# Patient Record
Sex: FEMALE | Race: WHITE
[De-identification: names, ages, dates, MRNs, and addresses within clinical notes are randomized per-mention and may not be internally consistent; named-entity substitution may affect disease eponyms.]

---

## 2020-11-15 ENCOUNTER — HOSPITAL ENCOUNTER (EMERGENCY)
Dept: HOSPITAL 33 - ED | Age: 45
Discharge: HOME | End: 2020-11-15
Payer: COMMERCIAL

## 2020-11-15 VITALS — DIASTOLIC BLOOD PRESSURE: 76 MMHG | HEART RATE: 59 BPM | OXYGEN SATURATION: 99 % | SYSTOLIC BLOOD PRESSURE: 144 MMHG

## 2020-11-15 DIAGNOSIS — K29.00: Primary | ICD-10-CM

## 2020-11-15 LAB
ALBUMIN SERPL-MCNC: 4.8 G/DL (ref 3.5–5)
ALP SERPL-CCNC: 55 U/L (ref 38–126)
ALT SERPL-CCNC: 18 U/L (ref 0–35)
AMYLASE SERPL-CCNC: 143 U/L (ref 30–110)
ANION GAP SERPL CALC-SCNC: 10.9 MEQ/L (ref 5–15)
AST SERPL QL: 27 U/L (ref 14–36)
BASOPHILS # BLD AUTO: 0.01 10*3/UL (ref 0–0.4)
BASOPHILS NFR BLD AUTO: 0.2 % (ref 0–0.4)
BILIRUB BLD-MCNC: 0.7 MG/DL (ref 0.2–1.3)
BUN SERPL-MCNC: 18 MG/DL (ref 7–17)
CALCIUM SPEC-MCNC: 9.4 MG/DL (ref 8.4–10.2)
CHLORIDE SERPL-SCNC: 102 MMOL/L (ref 98–107)
CO2 SERPL-SCNC: 30 MMOL/L (ref 22–30)
CREAT SERPL-MCNC: 0.8 MG/DL (ref 0.52–1.04)
EOSINOPHIL # BLD AUTO: 0.06 10*3/UL (ref 0–0.5)
GFR SERPLBLD BASED ON 1.73 SQ M-ARVRAT: > 60 ML/MIN
GLUCOSE SERPL-MCNC: 94 MG/DL (ref 74–106)
GLUCOSE UR-MCNC: NEGATIVE MG/DL
HCT VFR BLD AUTO: 40.1 % (ref 35–47)
HGB BLD-MCNC: 13.3 GM/DL (ref 12–16)
LIPASE SERPL-CCNC: 200 U/L (ref 23–300)
LYMPHOCYTES # SPEC AUTO: 2.12 10*3/UL (ref 1–4.6)
MCH RBC QN AUTO: 28.4 PG (ref 26–32)
MCHC RBC AUTO-ENTMCNC: 33.2 G/DL (ref 32–36)
MONOCYTES # BLD AUTO: 0.38 10*3/UL (ref 0–1.3)
PLATELET # BLD AUTO: 266 K/MM3 (ref 150–450)
POTASSIUM SERPLBLD-SCNC: 4.3 MMOL/L (ref 3.5–5.1)
PROT SERPL-MCNC: 8.8 G/DL (ref 6.3–8.2)
PROT UR STRIP-MCNC: NEGATIVE MG/DL
RBC # BLD AUTO: 4.69 M/MM3 (ref 4.1–5.4)
RBC #/AREA URNS HPF: (no result) /HPF (ref 0–2)
SODIUM SERPL-SCNC: 138 MMOL/L (ref 137–145)
WBC # BLD AUTO: 4.9 K/MM3 (ref 4–10.5)
WBC #/AREA URNS HPF: (no result) /HPF (ref 0–5)

## 2020-11-15 PROCEDURE — 76705 ECHO EXAM OF ABDOMEN: CPT

## 2020-11-15 PROCEDURE — 96375 TX/PRO/DX INJ NEW DRUG ADDON: CPT

## 2020-11-15 PROCEDURE — 74177 CT ABD & PELVIS W/CONTRAST: CPT

## 2020-11-15 PROCEDURE — 96374 THER/PROPH/DIAG INJ IV PUSH: CPT

## 2020-11-15 PROCEDURE — 80053 COMPREHEN METABOLIC PANEL: CPT

## 2020-11-15 PROCEDURE — 36415 COLL VENOUS BLD VENIPUNCTURE: CPT

## 2020-11-15 PROCEDURE — 83690 ASSAY OF LIPASE: CPT

## 2020-11-15 PROCEDURE — 36000 PLACE NEEDLE IN VEIN: CPT

## 2020-11-15 PROCEDURE — 96360 HYDRATION IV INFUSION INIT: CPT

## 2020-11-15 PROCEDURE — 81001 URINALYSIS AUTO W/SCOPE: CPT

## 2020-11-15 PROCEDURE — 99284 EMERGENCY DEPT VISIT MOD MDM: CPT

## 2020-11-15 PROCEDURE — 96376 TX/PRO/DX INJ SAME DRUG ADON: CPT

## 2020-11-15 PROCEDURE — 82150 ASSAY OF AMYLASE: CPT

## 2020-11-15 PROCEDURE — 85025 COMPLETE CBC W/AUTO DIFF WBC: CPT

## 2020-11-15 NOTE — XRAY
Indication: Right upper quadrant pain and nausea.



Multiple contiguous axial images obtained through the abdomen and pelvis using

80 cc Isovue 370 contrast only.



Comparison: None



Lung bases demonstrates mild dependent atelectasis.  No infiltrate or

effusion.  Heart is not enlarged.



Noncontrasted stomach and bowel loops appear nonobstructed.  Appendectomy and

hysterectomy reported.  There is mild diffuse fecal debris throughout.  No

free fluid/air.  Tiny calcified splenic granulomas.



The remaining liver, gallbladder, pancreas, spleen, adrenal glands, kidneys,

ureters, bladder, and aorta appear unremarkable.  No pathologic

retroperitoneal lymphadenopathy.



Osseous structures intact.  No ventral or inguinal hernias.



Impression:

1.  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

granulomatous disease.

2.  Remaining CT abdomen/pelvis with contrast exam is negative.



Comment: Preliminary interpretation was made by [facility].  No critical discrepancy.

## 2020-11-15 NOTE — ERPHSYRPT
- History of Present Illness


Time Seen by Provider: 11/15/20 12:36


Historian: patient


Exam Limitations: no limitations


Patient Subjective Stated Complaint: pt has right upper quad pain since last 

night getting worse with nausea,BM today that  was normal, no difficulty with 

voiding


Triage Nursing Assessment: pt alert, walked in with face mask in place,holding 

onto right side of abd , no edema, skin w/d/p


Physician History: 





45 years old female presented to the ER with chief complaint of right upper 

quadrant pain sudden onset last night which lasted for almost an hour and eased 

up on its own before she went to bed.  This morning she woke up with pain right 

upper quadrant again moderate to severe intensity, sharp shooting in nature, 

comes and goes, aggravated with palpation and no significant relieving factors, 

associated with nausea but no vomiting.  Denies fever chills or cough.  No sick 

contact.


Timing/Duration: yesterday, sudden, worse


Activities at Onset: rest


Quality: sharpness, stabbing


Abdominal Pain Onset Location: RUQ


Pain Radiation: no radiation


Severity of Pain-Max: severe


Severity of Pain-Current: moderate


Modifying Factors: Worsens With: palpation


Associated Symptoms: nausea, No chest pain, No vomiting


Previous symptoms: no prior history


Allergies/Adverse Reactions: 








No Known Drug Allergies Allergy (Verified 11/15/20 12:28)


   





Home Medications: 








Alprazolam 1 mg*** [Xanax 1 mg***] 1 mg PO HSPRN PRN 12/15/13 [History]


Estradiol 1 mg*** [Estrace 1 mg***] 1 mg PO DAILY 12/15/13 [History]


Levothyroxine Sodium 1 ea DAILY 11/15/20 [History]





Hx Tetanus, Diphtheria Vaccination/Date Given: Yes


Hx Influenza Vaccination/Date Given: No


Hx Pneumococcal Vaccination/Date Given: No


Immunizations Up to Date: Yes





Travel Risk





- International Travel


Have you traveled outside of the country in past 3 weeks: No





- Coronavirus Screening


Are you exhibiting any of the following symptoms?: No


Close contact with a COVID-19 positive Pt in past 14-21 Days: Yes





- Review of Systems


Constitutional: No Symptoms


Eyes: No Symptoms


Ears, Nose, & Throat: No Symptoms


Respiratory: No Symptoms


Cardiac: No Symptoms


Abdominal/Gastrointestinal: Abdominal Pain, Nausea


Genitourinary Symptoms: No Symptoms


Musculoskeletal: No Symptoms


Skin: No Symptoms


Neurological: No Symptoms


Psychological: No Symptoms


Endocrine: No Symptoms


Hematologic/Lymphatic: No Symptoms


Immunological/Allergic: No Symptoms





- Past Medical History


Pertinent Past Medical History: Yes


Psycho-Social History: Anxiety





- Past Surgical History


Past Surgical History: Yes (see history of present illness)


Neuro Surgical History: No Pertinent History


Cardiac: No Pertinent History


Respiratory: No Pertinent History


Gastrointestinal: No Pertinent History


Genitourinary: No Pertinent History


Musculoskeletal: Orthopedic Surgery


Female Surgical History: Hysterectomy


Other Surgical History: LEFT SHOULDER BONE SPURS SHAVED OFF, knee surg





- Social History


Smoking Status: Never smoker


Exposure to second hand smoke: No


Drug Use: none


Patient Lives Alone: No





- Female History


Hx Last Menstrual Period: hyester


Hx Pregnant Now: No





- Nursing Vital Signs


Nursing Vital Signs: 


                               Initial Vital Signs











Pulse Rate  96 H  11/15/20 14:51


 


Respiratory Rate  18   11/15/20 14:51


 


Blood Pressure  119/80   11/15/20 14:51


 


O2 Sat by Pulse Oximetry  97   11/15/20 14:51








                                   Pain Scale











Pain Intensity                 0

















- Physical Exam


General Appearance: no apparent distress, alert


Eye Exam: PERRL/EOMI


Ears, Nose, Throat Exam: normal ENT inspection, TMs normal, pharynx normal


Neck Exam: normal inspection, non-tender, supple, full range of motion


Respiratory Exam: normal breath sounds, lungs clear


Cardiovascular Exam: regular rate/rhythm, normal heart sounds


Gastrointestinal/Abdomen Exam: soft, normal bowel sounds, tenderness (Right 

upper quadrant.  Positive Espinoza sign.), guarding


Back Exam: normal inspection, normal range of motion


Extremity Exam: normal inspection, normal range of motion


Neurologic Exam: alert, oriented x 3, cooperative


Skin Exam: normal color


**SpO2 Interpretation**: normal


O2 Delivery: Room Air


Ordered Tests: 


                               Active Orders 24 hr











 Category Date Time Status


 


 IV Insertion STAT Care  11/15/20 12:43 Completed


 


 NPO (ED) STAT Care  11/15/20 12:43 Completed


 


 ABDOMEN AND PELVIS W CONTRAST [CT] Stat Exams  11/15/20 13:44 Completed


 


 GALLBLADDER [US] Stat Exams  11/15/20 16:47 Taken


 


 AMYLASE Stat Lab  11/15/20 12:43 Completed


 


 CBC W DIFF Stat Lab  11/15/20 12:43 Completed


 


 CMP Stat Lab  11/15/20 12:43 Completed


 


 LIPASE Stat Lab  11/15/20 12:43 Completed


 


 UA W/RFX UR CULTURE Stat Lab  11/15/20 13:01 Completed








Medication Summary














Discontinued Medications














Generic Name Dose Route Start Last Admin





  Trade Name Emmanuel  PRN Reason Stop Dose Admin


 


Al Hydrox/Mg Hydrox/Simethicone  Confirm  11/15/20 14:36 





  Maalox Es 30 Ml Unit Dose***  Administered  11/15/20 14:37 





  Dose  





  30 ml  





  .ROUTE  





  .STK-MED ONE  


 


Famotidine  20 mg  11/15/20 12:43  11/15/20 13:02





  Pepcid 20 Mg Vial***  IV  11/15/20 12:44  20 mg





  STAT ONE   Administration


 


Famotidine  Confirm  11/15/20 12:55 





  Pepcid 20 Mg Vial***  Administered  11/15/20 12:56 





  Dose  





  20 mg  





  IV  





  .STK-MED ONE  


 


Sodium Chloride  1,000 mls @ 999 mls/hr  11/15/20 12:43  11/15/20 12:57





  Sodium Chloride 0.9% 1000 Ml  IV  11/15/20 13:43  999 mls/hr





  .Q1H1M STA   Administration


 


Sodium Chloride  Confirm  11/15/20 12:55 





  Sodium Chloride 0.9% 1000 Ml  Administered  11/15/20 12:56 





  Dose  





  1,000 mls @ ud  





  .ROUTE  





  .STK-MED ONE  


 


Lidocaine HCl  Confirm  11/15/20 14:36 





  Xylocaine Hcl Viscous *  Administered  11/15/20 14:37 





  Dose  





  15 ml  





  .ROUTE  





  .STK-MED ONE  


 


Magnesium Hydroxide  45 ml  11/15/20 14:32  11/15/20 17:03





  Gi Cocktail 45 Ml (Maalox/Lidocaine)  PO  11/15/20 14:33  Not Given





  STAT ONE  


 


Morphine Sulfate  4 mg  11/15/20 12:43  11/15/20 13:01





  Morphine Sulfate 4 Mg Inj***  IV  11/15/20 12:44  4 mg





  STAT ONE   Administration


 


Morphine Sulfate  Confirm  11/15/20 12:55 





  Morphine Sulfate 4 Mg Inj***  Administered  11/15/20 12:56 





  Dose  





  4 mg  





  .ROUTE  





  .STK-MED ONE  


 


Morphine Sulfate  4 mg  11/15/20 14:32  11/15/20 14:38





  Morphine Sulfate 4 Mg Inj***  IV  11/15/20 14:33  4 mg





  STAT ONE   Administration


 


Morphine Sulfate  Confirm  11/15/20 14:36 





  Morphine Sulfate 4 Mg Inj***  Administered  11/15/20 14:37 





  Dose  





  4 mg  





  .ROUTE  





  .STK-MED ONE  


 


Ondansetron HCl  4 mg  11/15/20 12:43  11/15/20 12:59





  Zofran 4 Mg/2 Ml Vial**  IV  11/15/20 12:44  4 mg





  STAT ONE   Administration


 


Ondansetron HCl  Confirm  11/15/20 12:55 





  Zofran 4 Mg/2 Ml Vial**  Administered  11/15/20 12:56 





  Dose  





  4 mg  





  .ROUTE  





  .STK-MED ONE  











Lab/Rad Data: 


                           Laboratory Result Diagrams





                                 11/15/20 12:43 





                                 11/15/20 12:43 





                               Laboratory Results











  11/15/20 11/15/20 11/15/20 Range/Units





  13:01 12:43 12:43 


 


WBC    4.9  (4.0-10.5)  K/mm3


 


RBC    4.69  (4.1-5.4)  M/mm3


 


Hgb    13.3  (12.0-16.0)  gm/dl


 


Hct    40.1  (35-47)  %


 


MCV    85.5  ()  fl


 


MCH    28.4  (26-32)  pg


 


MCHC    33.2  (32-36)  g/dl


 


RDW    13.6  (11.5-14.0)  %


 


Plt Count    266  (150-450)  K/mm3


 


MPV    8.8  (7.5-11.0)  fl


 


Gran %    47.4  (36.0-66.0)  %


 


Eos # (Auto)    0.06  (0-0.5)  


 


Absolute Lymphs (auto)    2.12  (1.0-4.6)  


 


Absolute Monos (auto)    0.38  (0.0-1.3)  


 


Lymphocytes %    43.4  (24.0-44.0)  %


 


Monocytes %    7.8  (0.0-12.0)  %


 


Eosinophils %    1.2  (0.00-5.0)  %


 


Basophils %    0.2  (0.0-0.4)  %


 


Absolute Granulocytes    2.32  (1.4-6.9)  


 


Basophils #    0.01  (0-0.4)  


 


Sodium   138   (137-145)  mmol/L


 


Potassium   4.3   (3.5-5.1)  mmol/L


 


Chloride   102   ()  mmol/L


 


Carbon Dioxide   30   (22-30)  mmol/L


 


Anion Gap   10.9   (5-15)  MEQ/L


 


BUN   18 H   (7-17)  mg/dL


 


Creatinine   0.80   (0.52-1.04)  mg/dL


 


Estimated GFR   > 60.0   ML/MIN


 


Glucose   94   ()  mg/dL


 


Calcium   9.4   (8.4-10.2)  mg/dL


 


Total Bilirubin   0.70   (0.2-1.3)  mg/dL


 


AST   27   (14-36)  U/L


 


ALT   18   (0-35)  U/L


 


Alkaline Phosphatase   55   ()  U/L


 


Serum Total Protein   8.8 H   (6.3-8.2)  g/dL


 


Albumin   4.8   (3.5-5.0)  g/dL


 


Amylase   143 H   ()  U/L


 


Lipase   200   ()  U/L


 


Urine Color  STRAW    (YELLOW)  


 


Urine Appearance  CLEAR    (CLEAR)  


 


Urine pH  7.0    (5-6)  


 


Ur Specific Gravity  1.004    (1.005-1.025)  


 


Urine Protein  NEGATIVE    (Negative)  


 


Urine Ketones  NEGATIVE    (NEGATIVE)  


 


Urine Blood  NEGATIVE    (0-5)  Ferdinand/ul


 


Urine Nitrite  NEGATIVE    (NEGATIVE)  


 


Urine Bilirubin  NEGATIVE    (NEGATIVE)  


 


Urine Urobilinogen  NEGATIVE    (0-1)  mg/dL


 


Ur Leukocyte Esterase  NEGATIVE    (NEGATIVE)  


 


Urine WBC (Auto)  3-5    (0-5)  /HPF


 


Urine RBC (Auto)  NONE    (0-2)  /HPF


 


U Epithel Cells (Auto)  RARE    (FEW)  /HPF


 


Urine Bacteria (Auto)  FEW    (NEGATIVE)  /HPF


 


Urine Mucus (Auto)  SLIGHT    (NEGATIVE)  /HPF


 


Urine Culture Reflexed  NO    (NO)  


 


Urine Glucose  NEGATIVE    (NEGATIVE)  mg/dL














- Progress


Progress: improved, pain not gone completely, re-examined


Progress Note: 





11/15/20 blood work-up is done for acute abdomen including CT abdomen pelvis 

with contrast which showed some element of gastritis but no acute cholecystitis.

  She has a negative ultrasound.  Normal white count, unremarkable chemistries 

including liver functions.  She is given symptomatic treatment with 

morphine/Pepcid/GI cocktail, on reevaluation feeling better.  Her pain is not 

completely relieved.  I believe she has gastritis, will start her on Carafate 

and Protonix and outpatient follow-up with primary care and gastroenterology r

ecommended to have possible upper GI scope for further evaluation of gastritis. 

 At this point patient does not need to be admitted or needs any further work-up

 and is stable for discharge with outpatient follow-up.  Discussed signs 

symptoms of worsening needing return to ER which he seems understanding.


Counseled pt/family regarding: lab results, diagnosis, need for follow-up, rad 

results





- Departure


Departure Disposition: Home


Clinical Impression: 


Gastritis


Qualifiers:


 Gastritis type: unspecified gastritis Chronicity: acute Gastritis bleeding: 

without bleeding Qualified Code(s): K29.00 - Acute gastritis without bleeding





Condition: Stable


Critical Care Time: No


Referrals: 


DANIEL EDWARDS NP [Primary Care Provider] -  (Call tomorrow for 

appointment)


CHINA PEÑA JR [NON-STAFF PHY W/O PRIVILEGES] -  (Call tomorrow for 

appointment)


Instructions:  Acute Abdomen (Belly Pain), Adult (DC), Gastritis (DC)


Additional Instructions: 


Do not take ibuprofen.  Take Tylenol as needed.  Follow-up with primary care and

gastroenterology for reevaluation.  Return to ER for any worsening.


Prescriptions: 


Sucralfate 1 gm*** [Carafate 1 GM***] 1 g PO ACHS #60 tablet


PANTOPRAZOLE 40 mg Tablet*** [Protonix 40MG Tablet***] 40 mg PO QAM 30 Days #30 

tab

## 2020-11-16 NOTE — XRAY
Indication: Right upper quadrant pain.  Nausea.



Two-dimensional gallbladder sonogram performed.



Comparison: September 11, 2018.



Gallbladder normally distended again without gallstones, wall thickening, or

pericholecystic fluid.  Common bile duct measures 3.3 mm.  No intrahepatic

biliary distention.  Remaining visualized portions of the liver, pancreas, and

right kidney appear sonographically normal.  Right kidney measures 8.4 cm in

length.  No ascites.



Impression: Continued negative gallbladder sonogram.



Comment: Preliminary report was given.

## 2023-08-21 ENCOUNTER — HOSPITAL ENCOUNTER (EMERGENCY)
Dept: HOSPITAL 33 - ED | Age: 48
Discharge: HOME | End: 2023-08-21
Payer: COMMERCIAL

## 2023-08-21 VITALS — TEMPERATURE: 97.2 F

## 2023-08-21 VITALS — OXYGEN SATURATION: 95 % | RESPIRATION RATE: 13 BRPM

## 2023-08-21 VITALS — DIASTOLIC BLOOD PRESSURE: 80 MMHG | SYSTOLIC BLOOD PRESSURE: 137 MMHG | HEART RATE: 65 BPM

## 2023-08-21 DIAGNOSIS — R07.9: ICD-10-CM

## 2023-08-21 DIAGNOSIS — Z79.899: ICD-10-CM

## 2023-08-21 DIAGNOSIS — N39.0: Primary | ICD-10-CM

## 2023-08-21 LAB
ALBUMIN SERPL-MCNC: 4.4 G/DL (ref 3.5–5)
ALP SERPL-CCNC: 65 U/L (ref 38–126)
ALT SERPL-CCNC: 24 U/L (ref 0–35)
ANION GAP SERPL CALC-SCNC: 14.5 MEQ/L (ref 5–15)
AST SERPL QL: 26 U/L (ref 14–36)
BACTERIA UR CULT: YES
BASOPHILS # BLD AUTO: 0.02 X10^3/UL (ref 0–0.4)
BASOPHILS NFR BLD AUTO: 0.5 % (ref 0–0.4)
BILIRUB BLD-MCNC: 0.7 MG/DL (ref 0.2–1.3)
BUN SERPL-MCNC: 15 MG/DL (ref 7–17)
CALCIUM SPEC-MCNC: 8.8 MG/DL (ref 8.4–10.2)
CHLORIDE SERPL-SCNC: 104 MMOL/L (ref 98–107)
CO2 SERPL-SCNC: 25 MMOL/L (ref 22–30)
CREAT SERPL-MCNC: 0.94 MG/DL (ref 0.52–1.04)
EOSINOPHIL # BLD AUTO: 0.06 X10^3/UL (ref 0–0.5)
GFR SERPLBLD BASED ON 1.73 SQ M-ARVRAT: > 60 ML/MIN
GLUCOSE SERPL-MCNC: 105 MG/DL (ref 74–106)
HCT VFR BLD AUTO: 38.9 % (ref 35–47)
HGB BLD-MCNC: 12.5 G/DL (ref 12–16)
IMM GRANULOCYTES # BLD: 0.02 X10^3U/L (ref 0–0.03)
IMM GRANULOCYTES NFR BLD: 0.5 % (ref 0–0.4)
LYMPHOCYTES # SPEC AUTO: 1.7 X10^3/UL (ref 1–4.6)
MCH RBC QN AUTO: 28 PG (ref 26–32)
MCHC RBC AUTO-ENTMCNC: 32.1 G/DL (ref 32–36)
MONOCYTES # BLD AUTO: 0.26 X10^3/UL (ref 0–1.3)
NRBC # BLD AUTO: 0 X10^3U/L (ref 0–0.01)
NRBC BLD AUTO-RTO: 0 % (ref 0–0.1)
PLATELET # BLD AUTO: 264 X10^3/UL (ref 150–450)
POTASSIUM SERPLBLD-SCNC: 4.2 MMOL/L (ref 3.5–5.1)
PROT SERPL-MCNC: 7.8 G/DL (ref 6.3–8.2)
RBC # BLD AUTO: 4.46 X10^6/UL (ref 4.1–5.4)
RBC # URNS HPF: (no result) /HPF (ref 0–5)
SODIUM SERPL-SCNC: 140 MMOL/L (ref 137–145)
T4 SERPL-MCNC: 9.26 UG/DL (ref 5.53–10.96)
WBC # BLD AUTO: 4 X10^3/UL (ref 4–10.5)
WBC URNS QL MICRO: (no result) /HPF (ref 0–5)

## 2023-08-21 PROCEDURE — 85025 COMPLETE CBC W/AUTO DIFF WBC: CPT

## 2023-08-21 PROCEDURE — 87086 URINE CULTURE/COLONY COUNT: CPT

## 2023-08-21 PROCEDURE — 94760 N-INVAS EAR/PLS OXIMETRY 1: CPT

## 2023-08-21 PROCEDURE — 84443 ASSAY THYROID STIM HORMONE: CPT

## 2023-08-21 PROCEDURE — 36415 COLL VENOUS BLD VENIPUNCTURE: CPT

## 2023-08-21 PROCEDURE — 96365 THER/PROPH/DIAG IV INF INIT: CPT

## 2023-08-21 PROCEDURE — 93005 ELECTROCARDIOGRAM TRACING: CPT

## 2023-08-21 PROCEDURE — 81001 URINALYSIS AUTO W/SCOPE: CPT

## 2023-08-21 PROCEDURE — 36000 PLACE NEEDLE IN VEIN: CPT

## 2023-08-21 PROCEDURE — 71045 X-RAY EXAM CHEST 1 VIEW: CPT

## 2023-08-21 PROCEDURE — 84484 ASSAY OF TROPONIN QUANT: CPT

## 2023-08-21 PROCEDURE — 93041 RHYTHM ECG TRACING: CPT

## 2023-08-21 PROCEDURE — 80053 COMPREHEN METABOLIC PANEL: CPT

## 2023-08-21 PROCEDURE — 84436 ASSAY OF TOTAL THYROXINE: CPT

## 2023-08-21 PROCEDURE — 85379 FIBRIN DEGRADATION QUANT: CPT

## 2023-08-21 PROCEDURE — 99284 EMERGENCY DEPT VISIT MOD MDM: CPT

## 2023-08-21 NOTE — XRAY
Indication: Chest pain.



Comparison: December 15, 2013



Portable chest unchanged again demonstrating normal heart, lungs, and bony

thorax with incidental old granulomatous disease.

## 2023-08-21 NOTE — ERPHSYRPT
- History of Present Illness


Time Seen by Provider: 08/21/23 10:42


Historian: patient


Exam Limitations: no limitations


Physician History: 





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 

company who presents with at least 4 episodes of chest pain that is central and 

substernal and radiates into her back.  She describes the pain as a burning 

pain.  The pain recurred last evening and lasted for 4 hours.  There was some 

radiation into her right arm and hand but this resolved.  Patient is under a lot

of stress at her work.  Patient has a history of hypothyroidism and anxiety.  

She is not short of breath.  She has no documented coronary artery disease.  She

has not seen a cardiologist in the past.


Timing/Duration: week(s) (4)


Activities at Onset: none


Quality: burning


Location: substernal, central


Chest Pain Radiation: back


Severity of Pain-Max: mild


Severity of Pain-Current: mild


Modifying Factors: Improves With: nothing


Associated Symptoms: denies symptoms


Prior Chest Pain/Cardiac Workup: stress test (A few years ago)


Nitro Today/Relief: no nitro taken today


Aspirin Treatment Today: no aspirin today


Allergies/Adverse Reactions: 








No Known Drug Allergies Allergy (Verified 11/15/20 12:28)


   





Home Medications: 








ALPRAZolam 1 MG*** [Xanax 1 mg***] 1 mg PO HSPRN PRN 12/15/13 [History]


Estradiol 1 mg*** [Estrace 1 mg***] 1 mg PO DAILY 12/15/13 [History]


Levothyroxine Sodium 1 ea DAILY 11/15/20 [History]


Zolpidem Tartrate 10 mg** [Ambien 10 MG**] 10 mg PO HS 08/21/23 [History]





Hx Tetanus, Diphtheria Vaccination/Date Given: Yes


Hx Influenza Vaccination/Date Given: No


Hx Pneumococcal Vaccination/Date Given: No





Travel Risk





- International Travel


Have you traveled outside of the country in past 3 weeks: No





- Coronavirus Screening


Are you exhibiting any of the following symptoms?: No


Close contact with a COVID-19 positive Pt in past 14-21 Days: No





- Review of Systems


Constitutional: No Symptoms


Eyes: No Symptoms


Ears, Nose, & Throat: No Symptoms


Respiratory: No Symptoms


Cardiac: Chest Pain (Described as burning)


Abdominal/Gastrointestinal: No Symptoms


Genitourinary Symptoms: No Symptoms


Musculoskeletal: No Symptoms


Skin: No Symptoms


Neurological: No Symptoms


Psychological: No Symptoms


Endocrine: No Symptoms


Hematologic/Lymphatic: No Symptoms


Immunological/Allergic: No Symptoms


All Other Systems: Reviewed and Negative





- Past Medical History


Pertinent Past Medical History: Yes


Psycho-Social History: Anxiety





- Past Surgical History


Past Surgical History: Yes (see history of present illness)


Neuro Surgical History: No Pertinent History


Cardiac: No Pertinent History


Respiratory: No Pertinent History


Gastrointestinal: No Pertinent History


Genitourinary: No Pertinent History


Musculoskeletal: Orthopedic Surgery


Female Surgical History: Hysterectomy


Other Surgical History: LEFT SHOULDER BONE SPURS SHAVED OFF, knee surg





- Social History


Smoking Status: Never smoker


Exposure to second hand smoke: No


Drug Use: none


Patient Lives Alone: No





- Nursing Vital Signs


Nursing Vital Signs: 


                               Initial Vital Signs











Temperature  97.2 F   08/21/23 10:41


 


Pulse Rate  74   08/21/23 10:41


 


Respiratory Rate  20   08/21/23 10:41


 


Blood Pressure  147/83   08/21/23 10:41


 


O2 Sat by Pulse Oximetry  95   08/21/23 10:41








                                   Pain Scale











Pain Intensity                 0

















- Physical Exam


General Appearance: no apparent distress, alert, anxiety


Eye Exam: PERRL/EOMI, eyes nml inspection


Ears, Nose, Throat Exam: normal ENT inspection, moist mucous membranes


Neck Exam: normal inspection, non-tender, supple, full range of motion


Respiratory Exam: normal breath sounds, chest tenderness, lungs clear, No 

respiratory distress, No airway intact


Cardiovascular Exam: regular rate/rhythm, normal heart sounds, normal peripheral

 pulses


Gastrointestinal/Abdomen Exam: soft, normal bowel sounds, No tenderness


Pelvic Exam: not done


Rectal Exam: not done


Back Exam: normal inspection, normal range of motion, No CVA tenderness, No 

vertebral tenderness


Extremity Exam: normal inspection, normal range of motion, pelvis stable


Neurologic Exam: alert, oriented x 3, cooperative, CNs II-XII nml as tested, 

normal mood/affect, nml cerebellar function, nml station & gait, sensation nml


Skin Exam: normal color, warm, dry


Lymphatic Exam: No adenopathy


**SpO2 Interpretation**: normal


O2 Delivery: Room Air





- Course


Nursing assessment & vital signs reviewed: Yes


EKG Interpreted by Me: RATE ([id number]), Sinus Rhythm, NORMAL AXIS, NORMAL INTERVALS, 

NORMAL QRS, Non-specific ST Changes, Other (No acute ischemic changes on today's

 twelve-lead EKG.)


Ordered Tests: 


                               Active Orders 24 hr











 Category Date Time Status


 


 Cardiac Monitor STAT Care  08/21/23 10:44 Active


 


 EKG-ER Only STAT Care  08/21/23 10:43 Active


 


 IV Insertion STAT Care  08/21/23 10:43 Active


 


 Pulse Oximetry (ED) STAT Care  08/21/23 10:43 Active


 


 CHEST 1 VIEW (PORTABLE) Stat Exams  08/21/23 10:43 Completed


 


 CBC W DIFF Stat Lab  08/21/23 11:11 Completed


 


 CMP Stat Lab  08/21/23 11:11 Completed


 


 CULTURE,URINE Stat Lab  08/21/23 11:27 Received


 


 D-DIMER QUANTITATIVE Stat Lab  08/21/23 11:11 Completed


 


 T4 (Thyroxine) Stat Lab  08/21/23 11:11 Completed


 


 TROPONIN Q4H Lab  08/21/23 11:11 Completed


 


 TROPONIN Q4H Lab  08/21/23 14:45 Ordered


 


 TROPONIN Q4H Lab  08/21/23 18:45 Ordered


 


 TSH, 3RD Generation Stat Lab  08/21/23 11:11 Completed


 


 UA W/RFX UR CULTURE Stat Lab  08/21/23 11:27 Completed








Medication Summary











Generic Name Dose Route Start Last Admin





  Trade Name Freq  PRN Reason Stop Dose Admin


 


Ceftriaxone Sodium/Dextrose  1 g in 50 mls @ 100 mls/hr  08/21/23 12:22 





  Rocephin 1 Gm-D5w 50 Ml Bag**  IV  08/21/23 12:51 





  STAT STA  














Discontinued Medications














Generic Name Dose Route Start Last Admin





  Trade Name Freq  PRN Reason Stop Dose Admin


 


Aspirin  324 mg  08/21/23 10:43  08/21/23 11:02





  Aspirin 81 Mg Tab.Chew  PO  08/21/23 10:44  324 mg





  STAT ONE   Administration


 


Aspirin  Confirm  08/21/23 11:03 





  Aspirin 81 Mg Tab.Chew  Administered  08/21/23 11:04 





  Dose  





  324 mg  





  .ROUTE  





  .STK-MED ONE  











Lab/Rad Data: 


                           Laboratory Result Diagrams





                                 08/21/23 11:11 





                                 08/21/23 11:11 





                               Laboratory Results











  08/21/23 08/21/23 08/21/23 Range/Units





  11:27 11:11 11:11 


 


WBC     (4.0-10.5)  x10^3/uL


 


RBC     (4.1-5.4)  x10^6/uL


 


Hgb     (12.0-16.0)  g/dL


 


Hct     (35-47)  %


 


MCV     ()  fL


 


MCH     (26-32)  pg


 


MCHC     (32-36)  g/dL


 


RDW     (11.5-14.0)  %


 


Plt Count     (150-450)  x10^3/uL


 


MPV     (7.5-11.0)  fL


 


Gran %     (36.0-66.0)  %


 


Immature Gran % (Auto)     (0.00-0.4)  %


 


Nucleat RBC Rel Count     (0.00-0.1)  %


 


Eos # (Auto)     (0-0.5)  x10^3/uL


 


Immature Gran # (Auto)     (0.00-0.03)  x10^3u/L


 


Absolute Lymphs (auto)     (1.0-4.6)  x10^3/uL


 


Absolute Monos (auto)     (0.0-1.3)  x10^3/uL


 


Absolute Nucleated RBC     (0.00-0.01)  x10^3u/L


 


Lymphocytes %     (24.0-44.0)  %


 


Monocytes %     (0.0-12.0)  %


 


Eosinophils %     (0.00-5.0)  %


 


Basophils %     (0.0-0.4)  %


 


Absolute Granulocytes     (1.4-6.9)  x10^3/uL


 


Basophils #     (0-0.4)  x10^3/uL


 


D-Dimer    0.24  (0.0-0.50)  mg/L


 


Sodium     (137-145)  mmol/L


 


Potassium     (3.5-5.1)  mmol/L


 


Chloride     ()  mmol/L


 


Carbon Dioxide     (22-30)  mmol/L


 


Anion Gap     (5-15)  MEQ/L


 


BUN     (7-17)  mg/dL


 


Creatinine     (0.52-1.04)  mg/dL


 


Estimated GFR     ML/MIN


 


Glucose     ()  mg/dL


 


Calcium     (8.4-10.2)  mg/dL


 


Total Bilirubin     (0.2-1.3)  mg/dL


 


AST     (14-36)  U/L


 


ALT     (0-35)  U/L


 


Alkaline Phosphatase     ()  U/L


 


Troponin I   < 0.012   (0.000-0.034)  ng/mL


 


Serum Total Protein     (6.3-8.2)  g/dL


 


Albumin     (3.5-5.0)  g/dL


 


Thyroxine (T4)     (5.53-10.96)  ug/dL


 


TSH 3rd Generation     (0.47-4.68)  mIU/L


 


Urine Color  Yellow    (Yellow)  


 


Urine Appearance  Turbid A    (Clear)  


 


Urine pH  7.5    (4.6-8.0)  


 


Ur Specific Gravity  1.020    (1.005-1.030)  


 


Urine Protein  Trace A    (Negative)  


 


Urine Glucose (UA)  Negative    (Negative)  mg/dL


 


Urine Ketones  Negative    (Negative)  


 


Urine Blood  Negative    (Negative)  


 


Urine Nitrite  Negative    (Negative)  


 


Urine Bilirubin  Negative    (Negative)  


 


Urine Urobilinogen  1.0 A    (0.2)  mg/dL


 


Ur Leukocyte Esterase  Moderate A    (Negative)  


 


U Hyaline Cast (Auto)  6-10 A    (0-2)  /LPF


 


Urine Microscopic RBC  3-5    (0-5)  /HPF


 


Urine Microscopic WBC  21-50 A    (0-5)  /HPF


 


Ur Epithelial Cells  Moderate A    (None Seen)  /HPF


 


Urine Bacteria  Many A    (None Seen)  /HPF


 


Urine Culture Reflexed  YES    (NO)  














  08/21/23 08/21/23 Range/Units





  11:11 11:11 


 


WBC   4.0  (4.0-10.5)  x10^3/uL


 


RBC   4.46  (4.1-5.4)  x10^6/uL


 


Hgb   12.5  (12.0-16.0)  g/dL


 


Hct   38.9  (35-47)  %


 


MCV   87.2  ()  fL


 


MCH   28.0  (26-32)  pg


 


MCHC   32.1  (32-36)  g/dL


 


RDW   13.3  (11.5-14.0)  %


 


Plt Count   264  (150-450)  x10^3/uL


 


MPV   8.2  (7.5-11.0)  fL


 


Gran %   47.9  (36.0-66.0)  %


 


Immature Gran % (Auto)   0.5 H  (0.00-0.4)  %


 


Nucleat RBC Rel Count   0.0  (0.00-0.1)  %


 


Eos # (Auto)   0.06  (0-0.5)  x10^3/uL


 


Immature Gran # (Auto)   0.02  (0.00-0.03)  x10^3u/L


 


Absolute Lymphs (auto)   1.70  (1.0-4.6)  x10^3/uL


 


Absolute Monos (auto)   0.26  (0.0-1.3)  x10^3/uL


 


Absolute Nucleated RBC   0.00  (0.00-0.01)  x10^3u/L


 


Lymphocytes %   43.0  (24.0-44.0)  %


 


Monocytes %   6.6  (0.0-12.0)  %


 


Eosinophils %   1.5  (0.00-5.0)  %


 


Basophils %   0.5  (0.0-0.4)  %


 


Absolute Granulocytes   1.89  (1.4-6.9)  x10^3/uL


 


Basophils #   0.02  (0-0.4)  x10^3/uL


 


D-Dimer    (0.0-0.50)  mg/L


 


Sodium  140   (137-145)  mmol/L


 


Potassium  4.2   (3.5-5.1)  mmol/L


 


Chloride  104   ()  mmol/L


 


Carbon Dioxide  25   (22-30)  mmol/L


 


Anion Gap  14.5   (5-15)  MEQ/L


 


BUN  15   (7-17)  mg/dL


 


Creatinine  0.94   (0.52-1.04)  mg/dL


 


Estimated GFR  > 60.0   ML/MIN


 


Glucose  105   ()  mg/dL


 


Calcium  8.8   (8.4-10.2)  mg/dL


 


Total Bilirubin  0.70   (0.2-1.3)  mg/dL


 


AST  26   (14-36)  U/L


 


ALT  24   (0-35)  U/L


 


Alkaline Phosphatase  65   ()  U/L


 


Troponin I    (0.000-0.034)  ng/mL


 


Serum Total Protein  7.8   (6.3-8.2)  g/dL


 


Albumin  4.4   (3.5-5.0)  g/dL


 


Thyroxine (T4)  9.26   (5.53-10.96)  ug/dL


 


TSH 3rd Generation  4.470   (0.47-4.68)  mIU/L


 


Urine Color    (Yellow)  


 


Urine Appearance    (Clear)  


 


Urine pH    (4.6-8.0)  


 


Ur Specific Gravity    (1.005-1.030)  


 


Urine Protein    (Negative)  


 


Urine Glucose (UA)    (Negative)  mg/dL


 


Urine Ketones    (Negative)  


 


Urine Blood    (Negative)  


 


Urine Nitrite    (Negative)  


 


Urine Bilirubin    (Negative)  


 


Urine Urobilinogen    (0.2)  mg/dL


 


Ur Leukocyte Esterase    (Negative)  


 


U Hyaline Cast (Auto)    (0-2)  /LPF


 


Urine Microscopic RBC    (0-5)  /HPF


 


Urine Microscopic WBC    (0-5)  /HPF


 


Ur Epithelial Cells    (None Seen)  /HPF


 


Urine Bacteria    (None Seen)  /HPF


 


Urine Culture Reflexed    (NO)  














- Progress


Progress: improved, re-examined


Air Movement: good


Progress Note: 





08/21/23 12:24


This patient's medical issue is 1 of moderate complexity.  The level of c

omplexity is based on review of the patient's past medical history, review the 

patient's medication list, review of the patient's drug allergy list, history of

 present illness and physical findings on examination.  This patient's work-up 

includes placement of intravenous line, twelve-lead EKG, D-dimer level, troponin

 level, CBC, CMP and urinalysis as well as T4 and TSH levels.  We also performed

 a chest x-ray.  This chest x-ray was interpreted by the radiologist and I 

reviewed the impression.  There is no evidence of any acute cardiopulmonary 

process.  I reviewed the results of the blood and urine as well as the twelve-

lead EKG.  Patient does have a significant urinary tract infection.  There are 

no other acute, emergent medical findings.  We will provide the patient with 1 g

 of intravenous Rocephin and then remotely send a prescription of Cipro 500 mg 

orally twice a day for 7 days to her pharmacy.  Patient is also encouraged to 

follow-up with her primary care provider today to make arrangements for follow-

up appointment for further evaluation including cardiac stress test and/or ref

erral to a cardiologist.


Blood Culture(s) Obtained: No


Antibiotics given: Yes


Counseled pt/family regarding: lab results, diagnosis, need for follow-up, rad 

results





Medical Desision Making





- Diagnostic Testing


Diagnostic test were ordered, analyzed, and reviewed by me: Yes


Radiological Interpretation: Reviewed by me, Teleradiologist Report





- Risk of complications


The pt has a mod risk of morbidity or mortality based on: Need for prescription 

drug management





- Departure


Departure Disposition: Home


Clinical Impression: 


 Chest pain, UTI (urinary tract infection)





Condition: Stable


Critical Care Time: No


Referrals: 


DANIEL EDWARDS NP [Primary Care Provider] - Follow up/PCP as directed


Additional Instructions: 


Drink plenty of fluids.  Take your antibiotics as prescribed.  Take your other 

medication as prescribed.  Call your primary care provider today to make 

arrangements for follow-up appointment including repeat cardiac stress test 

and/or referral to cardiologist for further evaluation management.


Prescriptions: 


Ciprofloxacin [Cipro 500 MG***] 500 mg PO BID #14 tablet